# Patient Record
Sex: MALE | Race: WHITE | NOT HISPANIC OR LATINO | Employment: OTHER | URBAN - METROPOLITAN AREA
[De-identification: names, ages, dates, MRNs, and addresses within clinical notes are randomized per-mention and may not be internally consistent; named-entity substitution may affect disease eponyms.]

---

## 2023-02-28 LAB
MUCUS, URINE: NORMAL /LPF
RBC, URINE: NORMAL /HPF (ref 0–5)
WBC, URINE: NORMAL /HPF (ref 0–5)

## 2023-03-01 LAB — URINE CULTURE: NORMAL

## 2023-03-13 LAB — PROSTATE SPECIFIC AG (NG/ML) IN SER/PLAS: 1.76 NG/ML (ref 0–4)

## 2023-10-15 PROBLEM — R73.01 FASTING HYPERGLYCEMIA: Status: ACTIVE | Noted: 2023-10-15

## 2023-10-15 PROBLEM — F41.8 SITUATIONAL ANXIETY: Status: ACTIVE | Noted: 2023-10-15

## 2023-10-15 PROBLEM — Z86.010 HISTORY OF COLON POLYPS: Status: ACTIVE | Noted: 2023-10-15

## 2023-10-15 PROBLEM — N39.0 ACUTE UTI: Status: ACTIVE | Noted: 2023-10-15

## 2023-10-15 PROBLEM — S93.401A SPRAIN OF RIGHT ANKLE: Status: ACTIVE | Noted: 2023-10-15

## 2023-10-15 PROBLEM — N50.3 EPIDIDYMAL CYST: Status: ACTIVE | Noted: 2023-10-15

## 2023-10-15 PROBLEM — M70.72: Status: ACTIVE | Noted: 2023-10-15

## 2023-10-15 PROBLEM — E78.00 ELEVATED CHOLESTEROL: Status: ACTIVE | Noted: 2023-10-15

## 2023-10-15 PROBLEM — I25.10 CORONARY ARTERY DISEASE, NON-OCCLUSIVE: Status: ACTIVE | Noted: 2023-10-15

## 2023-10-15 PROBLEM — H83.09 LABYRINTHITIS: Status: ACTIVE | Noted: 2023-10-15

## 2023-10-15 PROBLEM — D69.6 THROMBOCYTOPENIA (CMS-HCC): Status: ACTIVE | Noted: 2023-10-15

## 2023-10-15 PROBLEM — Z86.0100 HISTORY OF COLON POLYPS: Status: ACTIVE | Noted: 2023-10-15

## 2023-10-15 PROBLEM — R39.15 URINARY URGENCY: Status: ACTIVE | Noted: 2023-10-15

## 2023-10-15 PROBLEM — N52.9 ED (ERECTILE DYSFUNCTION): Status: ACTIVE | Noted: 2023-10-15

## 2023-10-15 PROBLEM — N13.8 BPH WITH OBSTRUCTION/LOWER URINARY TRACT SYMPTOMS: Status: ACTIVE | Noted: 2023-10-15

## 2023-10-15 PROBLEM — N40.1 BPH WITH OBSTRUCTION/LOWER URINARY TRACT SYMPTOMS: Status: ACTIVE | Noted: 2023-10-15

## 2023-10-15 PROBLEM — R31.9 HEMATURIA: Status: ACTIVE | Noted: 2023-10-15

## 2023-10-15 PROBLEM — K21.9 GERD WITHOUT ESOPHAGITIS: Status: ACTIVE | Noted: 2023-10-15

## 2023-10-15 RX ORDER — POLYETHYLENE GLYCOL 3350, SODIUM CHLORIDE, SODIUM BICARBONATE, POTASSIUM CHLORIDE 420; 11.2; 5.72; 1.48 G/4L; G/4L; G/4L; G/4L
POWDER, FOR SOLUTION ORAL
COMMUNITY
Start: 2021-09-03

## 2023-10-17 ENCOUNTER — OFFICE VISIT (OUTPATIENT)
Dept: UROLOGY | Facility: CLINIC | Age: 78
End: 2023-10-17
Payer: MEDICARE

## 2023-10-17 VITALS — SYSTOLIC BLOOD PRESSURE: 147 MMHG | HEART RATE: 72 BPM | DIASTOLIC BLOOD PRESSURE: 78 MMHG | TEMPERATURE: 96.4 F

## 2023-10-17 DIAGNOSIS — N52.9 VASCULOGENIC ERECTILE DYSFUNCTION, UNSPECIFIED VASCULOGENIC ERECTILE DYSFUNCTION TYPE: Primary | ICD-10-CM

## 2023-10-17 DIAGNOSIS — N48.6 PEYRONIE'S DISEASE: ICD-10-CM

## 2023-10-17 PROCEDURE — 99214 OFFICE O/P EST MOD 30 MIN: CPT | Performed by: NURSE PRACTITIONER

## 2023-10-17 RX ORDER — TADALAFIL 5 MG/1
5 TABLET ORAL NIGHTLY
Qty: 30 TABLET | Refills: 3 | Status: SHIPPED | OUTPATIENT
Start: 2023-10-17 | End: 2024-03-11 | Stop reason: SDUPTHER

## 2023-10-17 ASSESSMENT — ENCOUNTER SYMPTOMS: SHORTNESS OF BREATH: 0

## 2023-10-17 NOTE — PROGRESS NOTES
Subjective   Patient ID: Xavier Steele is a 77 y.o. male who presents for Follow-up and Erectile Dysfunction.  77y  male who presents for f/u of ED. He states this initially began 15 years ago. He was found to have elevated estradiol. Underwent treatment, unknown what type, without improvement.      History of CAD. Tried dietary changes with improvement of lipids and resolution of chest pain however no improvement. Failed PDE5-I at max doses, anxiety with 10mg tadalafil      Mild increase in urgency recently. He was previously on saw palmetto although he has not taken in over a year. Wondering regarding restarting for the benefit of this.     He has a partner, wife for 50 years.      Currently using 100/30/2/4. He has utilized 60 units with minor dizziness, most consistently using 50 units. He states foreplay is necessary to improve erection. He is also using NICOLE after injection. He states erection remains until removal of ring     He did state that with alternating foreplay and use of vacuum erection device he is able to eventually obtain an erection that is of adequate quality for intercourse and resolves within 45 to 50 minutes. His wife is pleased regarding results and visit and for increased the amount of times that they are intimate. He states regardless of concentration he has similar response. Always requiring ring to maintain.      Developing curvature to the right. States erections are not painful and he is not having trouble with penetration.           Review of Systems   Respiratory:  Negative for shortness of breath (ere).    All other systems reviewed and are negative.      Objective   Physical Exam  Genitourinary:     Penis: Circumcised.       Comments: Scar tissue noted at the base of the penis on the right corpora, 2-3 cm. No pain with palpation       Alert and oriented x3  Moist mucous membranes  Breathes easily on room air  Abdomen soft, nondistended.  No edema  No scleral icterus  No  focal neurological deficits  Appears stated age, no acute distress    Assessment/Plan   Problem List Items Addressed This Visit       ED (erectile dysfunction) - Primary    Relevant Medications    tadalafil (Cialis) 5 mg tablet     Other Visit Diagnoses       Peyronie's disease        Relevant Medications    tadalafil (Cialis) 5 mg tablet             Start low dose cialis, stop if he develops side effects. Will pause use of trimix at this time. Continue with regular use of NICOLE and use of constriction ring.

## 2023-10-31 ENCOUNTER — APPOINTMENT (OUTPATIENT)
Dept: RADIOLOGY | Facility: HOSPITAL | Age: 78
End: 2023-10-31
Payer: MEDICARE

## 2023-11-17 ENCOUNTER — HOSPITAL ENCOUNTER (OUTPATIENT)
Dept: RADIOLOGY | Facility: HOSPITAL | Age: 78
Discharge: HOME | End: 2023-11-17
Payer: MEDICARE

## 2023-11-17 DIAGNOSIS — Z13.6 ENCOUNTER FOR SCREENING FOR CARDIOVASCULAR DISORDERS: ICD-10-CM

## 2023-11-17 PROCEDURE — 75571 CT HRT W/O DYE W/CA TEST: CPT

## 2024-01-18 ENCOUNTER — APPOINTMENT (OUTPATIENT)
Dept: UROLOGY | Facility: CLINIC | Age: 79
End: 2024-01-18
Payer: MEDICARE

## 2024-02-21 ENCOUNTER — LAB (OUTPATIENT)
Dept: LAB | Facility: LAB | Age: 79
End: 2024-02-21
Payer: MEDICARE

## 2024-02-21 DIAGNOSIS — Z00.00 ENCOUNTER FOR GENERAL ADULT MEDICAL EXAMINATION WITHOUT ABNORMAL FINDINGS: Primary | ICD-10-CM

## 2024-02-21 LAB
ALBUMIN SERPL BCP-MCNC: 3.8 G/DL (ref 3.4–5)
ALP SERPL-CCNC: 57 U/L (ref 33–136)
ALT SERPL W P-5'-P-CCNC: 14 U/L (ref 10–52)
ANION GAP SERPL CALC-SCNC: 10 MMOL/L (ref 10–20)
AST SERPL W P-5'-P-CCNC: 19 U/L (ref 9–39)
BASOPHILS # BLD AUTO: 0.05 X10*3/UL (ref 0–0.1)
BASOPHILS NFR BLD AUTO: 0.9 %
BILIRUB SERPL-MCNC: 0.6 MG/DL (ref 0–1.2)
BUN SERPL-MCNC: 11 MG/DL (ref 6–23)
CALCIUM SERPL-MCNC: 8.9 MG/DL (ref 8.6–10.6)
CHLORIDE SERPL-SCNC: 105 MMOL/L (ref 98–107)
CHOLEST SERPL-MCNC: 130 MG/DL (ref 0–199)
CHOLESTEROL/HDL RATIO: 4.4
CO2 SERPL-SCNC: 30 MMOL/L (ref 21–32)
CREAT SERPL-MCNC: 0.76 MG/DL (ref 0.5–1.3)
EGFRCR SERPLBLD CKD-EPI 2021: >90 ML/MIN/1.73M*2
EOSINOPHIL # BLD AUTO: 0.14 X10*3/UL (ref 0–0.4)
EOSINOPHIL NFR BLD AUTO: 2.4 %
ERYTHROCYTE [DISTWIDTH] IN BLOOD BY AUTOMATED COUNT: 12.2 % (ref 11.5–14.5)
GLUCOSE SERPL-MCNC: 89 MG/DL (ref 74–99)
HCT VFR BLD AUTO: 43.4 % (ref 41–52)
HDLC SERPL-MCNC: 29.6 MG/DL
HGB BLD-MCNC: 14.6 G/DL (ref 13.5–17.5)
IMM GRANULOCYTES # BLD AUTO: 0.03 X10*3/UL (ref 0–0.5)
IMM GRANULOCYTES NFR BLD AUTO: 0.5 % (ref 0–0.9)
LDLC SERPL CALC-MCNC: 71 MG/DL
LYMPHOCYTES # BLD AUTO: 1.23 X10*3/UL (ref 0.8–3)
LYMPHOCYTES NFR BLD AUTO: 21.2 %
MAGNESIUM SERPL-MCNC: 2.05 MG/DL (ref 1.6–2.4)
MCH RBC QN AUTO: 31.9 PG (ref 26–34)
MCHC RBC AUTO-ENTMCNC: 33.6 G/DL (ref 32–36)
MCV RBC AUTO: 95 FL (ref 80–100)
MONOCYTES # BLD AUTO: 0.68 X10*3/UL (ref 0.05–0.8)
MONOCYTES NFR BLD AUTO: 11.7 %
NEUTROPHILS # BLD AUTO: 3.68 X10*3/UL (ref 1.6–5.5)
NEUTROPHILS NFR BLD AUTO: 63.3 %
NON HDL CHOLESTEROL: 100 MG/DL (ref 0–149)
NRBC BLD-RTO: 0 /100 WBCS (ref 0–0)
PLATELET # BLD AUTO: 196 X10*3/UL (ref 150–450)
POTASSIUM SERPL-SCNC: 4.8 MMOL/L (ref 3.5–5.3)
PROT SERPL-MCNC: 6.6 G/DL (ref 6.4–8.2)
RBC # BLD AUTO: 4.57 X10*6/UL (ref 4.5–5.9)
SODIUM SERPL-SCNC: 140 MMOL/L (ref 136–145)
TRIGL SERPL-MCNC: 146 MG/DL (ref 0–149)
TSH SERPL-ACNC: 2.91 MIU/L (ref 0.44–3.98)
VLDL: 29 MG/DL (ref 0–40)
WBC # BLD AUTO: 5.8 X10*3/UL (ref 4.4–11.3)

## 2024-02-21 PROCEDURE — 80061 LIPID PANEL: CPT

## 2024-02-21 PROCEDURE — 36415 COLL VENOUS BLD VENIPUNCTURE: CPT

## 2024-02-21 PROCEDURE — 80053 COMPREHEN METABOLIC PANEL: CPT

## 2024-02-21 PROCEDURE — 83735 ASSAY OF MAGNESIUM: CPT

## 2024-02-21 PROCEDURE — 85025 COMPLETE CBC W/AUTO DIFF WBC: CPT | Mod: WAIVER OF LIABILITY ON FILE

## 2024-02-21 PROCEDURE — 84443 ASSAY THYROID STIM HORMONE: CPT

## 2024-02-23 ENCOUNTER — APPOINTMENT (OUTPATIENT)
Dept: UROLOGY | Facility: CLINIC | Age: 79
End: 2024-02-23
Payer: MEDICARE

## 2024-02-23 ENCOUNTER — OFFICE VISIT (OUTPATIENT)
Dept: UROLOGY | Facility: CLINIC | Age: 79
End: 2024-02-23
Payer: MEDICARE

## 2024-02-23 VITALS
BODY MASS INDEX: 22.89 KG/M2 | DIASTOLIC BLOOD PRESSURE: 76 MMHG | SYSTOLIC BLOOD PRESSURE: 132 MMHG | HEART RATE: 67 BPM | WEIGHT: 155 LBS | TEMPERATURE: 98.1 F

## 2024-02-23 DIAGNOSIS — N40.1 BPH WITH OBSTRUCTION/LOWER URINARY TRACT SYMPTOMS: ICD-10-CM

## 2024-02-23 DIAGNOSIS — N13.8 BPH WITH OBSTRUCTION/LOWER URINARY TRACT SYMPTOMS: ICD-10-CM

## 2024-02-23 DIAGNOSIS — N52.9 VASCULOGENIC ERECTILE DYSFUNCTION, UNSPECIFIED VASCULOGENIC ERECTILE DYSFUNCTION TYPE: Primary | ICD-10-CM

## 2024-02-23 PROCEDURE — 1159F MED LIST DOCD IN RCRD: CPT | Performed by: NURSE PRACTITIONER

## 2024-02-23 PROCEDURE — 1036F TOBACCO NON-USER: CPT | Performed by: NURSE PRACTITIONER

## 2024-02-23 PROCEDURE — 99213 OFFICE O/P EST LOW 20 MIN: CPT | Performed by: NURSE PRACTITIONER

## 2024-02-23 NOTE — PROGRESS NOTES
Subjective   Patient ID: Xavier Steele is a 78 y.o. male who presents for Erectile Dysfunction (Follow Up ) and Follow-up.  Follow up of ED. He states this initially began 15 years ago. He was found to have elevated estradiol. Underwent treatment, unknown what type, without improvement.      History of CAD.     Mild increase in urgency recently. Doing well with tadalafil 5mg daily.      He has a partner, wife for 50 years.      He did state that with alternating foreplay and use of vacuum erection device he is able to eventually obtain an erection that is of adequate quality for intercourse and resolves within 45 to 50 minutes. His wife is pleased regarding results and visit and for increased the amount of times that they are intimate. He states regardless of concentration he has similar response. Always requiring ring to maintain.      Developing curvature to the right. States erections are not painful and he is not having trouble with penetration. States minimal improvement of curvature with use of pump. No longer using ICI.           Review of Systems   All other systems reviewed and are negative.      Objective   Physical Exam  Vitals reviewed.     Alert and oriented x3  Moist mucous membranes  Breathes easily on room air  Abdomen soft, nondistended  No edema  No scleral icterus  No focal neurological deficits  Appears stated age, no acute distress      Assessment/Plan   Diagnoses and all orders for this visit:  Vasculogenic erectile dysfunction, unspecified vasculogenic erectile dysfunction type           YESICA Morel-CNP 02/23/24 10:36 AM

## 2024-03-11 DIAGNOSIS — N48.6 PEYRONIE'S DISEASE: ICD-10-CM

## 2024-03-11 DIAGNOSIS — N52.9 VASCULOGENIC ERECTILE DYSFUNCTION, UNSPECIFIED VASCULOGENIC ERECTILE DYSFUNCTION TYPE: ICD-10-CM

## 2024-03-11 RX ORDER — TADALAFIL 5 MG/1
5 TABLET ORAL NIGHTLY
Qty: 90 TABLET | Refills: 3 | Status: SHIPPED | OUTPATIENT
Start: 2024-03-11 | End: 2025-03-11

## 2024-09-23 ENCOUNTER — HOSPITAL ENCOUNTER (OUTPATIENT)
Dept: RADIOLOGY | Facility: HOSPITAL | Age: 79
Discharge: HOME | End: 2024-09-23
Payer: MEDICARE

## 2024-09-23 ENCOUNTER — LAB (OUTPATIENT)
Dept: LAB | Facility: LAB | Age: 79
End: 2024-09-23
Payer: MEDICARE

## 2024-09-23 DIAGNOSIS — R91.1 SOLITARY PULMONARY NODULE: ICD-10-CM

## 2024-09-23 DIAGNOSIS — R79.89 OTHER SPECIFIED ABNORMAL FINDINGS OF BLOOD CHEMISTRY: Primary | ICD-10-CM

## 2024-09-23 LAB — TSH SERPL-ACNC: 2.5 MIU/L (ref 0.44–3.98)

## 2024-09-23 PROCEDURE — 71250 CT THORAX DX C-: CPT | Performed by: RADIOLOGY

## 2024-09-23 PROCEDURE — 71250 CT THORAX DX C-: CPT

## 2024-09-23 PROCEDURE — 36415 COLL VENOUS BLD VENIPUNCTURE: CPT

## 2025-02-24 ENCOUNTER — APPOINTMENT (OUTPATIENT)
Dept: UROLOGY | Facility: CLINIC | Age: 80
End: 2025-02-24
Payer: MEDICARE

## 2025-03-13 ENCOUNTER — APPOINTMENT (OUTPATIENT)
Dept: UROLOGY | Facility: CLINIC | Age: 80
End: 2025-03-13
Payer: MEDICARE

## 2025-03-13 VITALS
HEIGHT: 69 IN | SYSTOLIC BLOOD PRESSURE: 149 MMHG | HEART RATE: 61 BPM | WEIGHT: 152.8 LBS | DIASTOLIC BLOOD PRESSURE: 79 MMHG | BODY MASS INDEX: 22.63 KG/M2

## 2025-03-13 DIAGNOSIS — N48.6 PEYRONIE'S DISEASE: ICD-10-CM

## 2025-03-13 DIAGNOSIS — N13.8 BPH WITH OBSTRUCTION/LOWER URINARY TRACT SYMPTOMS: ICD-10-CM

## 2025-03-13 DIAGNOSIS — N52.9 VASCULOGENIC ERECTILE DYSFUNCTION, UNSPECIFIED VASCULOGENIC ERECTILE DYSFUNCTION TYPE: Primary | ICD-10-CM

## 2025-03-13 DIAGNOSIS — N40.1 BPH WITH OBSTRUCTION/LOWER URINARY TRACT SYMPTOMS: ICD-10-CM

## 2025-03-13 LAB
POC APPEARANCE, URINE: CLEAR
POC BILIRUBIN, URINE: NEGATIVE
POC BLOOD, URINE: NEGATIVE
POC COLOR, URINE: YELLOW
POC GLUCOSE, URINE: NEGATIVE MG/DL
POC KETONES, URINE: NEGATIVE MG/DL
POC LEUKOCYTES, URINE: NEGATIVE
POC NITRITE,URINE: NEGATIVE
POC PH, URINE: 5.5 PH
POC PROTEIN, URINE: NEGATIVE MG/DL
POC SPECIFIC GRAVITY, URINE: >=1.03
POC UROBILINOGEN, URINE: 0.2 EU/DL

## 2025-03-13 PROCEDURE — 81003 URINALYSIS AUTO W/O SCOPE: CPT | Performed by: NURSE PRACTITIONER

## 2025-03-13 PROCEDURE — 99213 OFFICE O/P EST LOW 20 MIN: CPT | Performed by: NURSE PRACTITIONER

## 2025-03-13 PROCEDURE — 1036F TOBACCO NON-USER: CPT | Performed by: NURSE PRACTITIONER

## 2025-03-13 PROCEDURE — 1159F MED LIST DOCD IN RCRD: CPT | Performed by: NURSE PRACTITIONER

## 2025-03-13 NOTE — PROGRESS NOTES
Subjective   Patient ID: Xavier Steele is a 79 y.o. male who presents for Follow-up (6 months/).  Follow up of ED. He states this initially began 15 years ago. He was found to have elevated estradiol. Underwent treatment, unknown what type, without improvement.      History of CAD.     He has a partner, wife for 50 years.      He did state that with alternating foreplay and use of vacuum erection device he is able to eventually obtain an erection that is of adequate quality for intercourse and resolves within 45 to 50 minutes. His wife is pleased regarding results and visit and for increased the amount of times that they are intimate. He states regardless of concentration he has similar response. Always requiring ring to maintain.      Developing curvature to the right. States erections are not painful and he is not having trouble with penetration. States minimal improvement of curvature with use of pump. No longer using ICI. Previously on tadalafil 5mg, ran out in Jan. Doing well with use of pump.              Review of Systems   All other systems reviewed and are negative.      Objective   Physical Exam  Vitals reviewed.     Alert and oriented x3  Moist mucous membranes  Breathes easily on room air  Abdomen soft, nondistended  No edema  No scleral icterus  No focal neurological deficits  Appears stated age, no acute distress    Office Visit on 03/13/2025   Component Date Value Ref Range Status    POC Color, Urine 03/13/2025 Yellow  Straw, Yellow, Light-Yellow Final    POC Appearance, Urine 03/13/2025 Clear  Clear Final    POC Glucose, Urine 03/13/2025 NEGATIVE  NEGATIVE mg/dl Final    POC Bilirubin, Urine 03/13/2025 NEGATIVE  NEGATIVE Final    POC Ketones, Urine 03/13/2025 NEGATIVE  NEGATIVE mg/dl Final    POC Specific Gravity, Urine 03/13/2025 >=1.030  1.005 - 1.035 Final    POC Blood, Urine 03/13/2025 NEGATIVE  NEGATIVE Final    POC PH, Urine 03/13/2025 5.5  No Reference Range Established PH Final    POC  Protein, Urine 03/13/2025 NEGATIVE  NEGATIVE mg/dl Final    POC Urobilinogen, Urine 03/13/2025 0.2  0.2, 1.0 EU/DL Final    Poc Nitrite, Urine 03/13/2025 NEGATIVE  NEGATIVE Final    POC Leukocytes, Urine 03/13/2025 NEGATIVE  NEGATIVE Final         Assessment/Plan   Diagnoses and all orders for this visit:  Vasculogenic erectile dysfunction, unspecified vasculogenic erectile dysfunction type  BPH with obstruction/lower urinary tract symptoms  -     POCT UA Automated manually resulted  Peyronie's disease    Pleased with current plan of care. Will plan for annual follow up or sooner if needed. Patient verbalized understanding of plan.         Bhavna Gutierrez, YESICA-CNP 03/13/25 9:22 AM

## 2025-06-13 ENCOUNTER — OFFICE VISIT (OUTPATIENT)
Dept: ORTHOPEDIC SURGERY | Facility: HOSPITAL | Age: 80
End: 2025-06-13
Payer: MEDICARE

## 2025-06-13 DIAGNOSIS — M65.311 TRIGGER FINGER OF RIGHT THUMB: Primary | ICD-10-CM

## 2025-06-13 PROCEDURE — 99202 OFFICE O/P NEW SF 15 MIN: CPT | Performed by: ORTHOPAEDIC SURGERY

## 2025-06-13 PROCEDURE — 99203 OFFICE O/P NEW LOW 30 MIN: CPT | Performed by: ORTHOPAEDIC SURGERY

## 2025-06-13 PROCEDURE — 1160F RVW MEDS BY RX/DR IN RCRD: CPT | Performed by: ORTHOPAEDIC SURGERY

## 2025-06-13 PROCEDURE — 1125F AMNT PAIN NOTED PAIN PRSNT: CPT | Performed by: ORTHOPAEDIC SURGERY

## 2025-06-13 PROCEDURE — 1036F TOBACCO NON-USER: CPT | Performed by: ORTHOPAEDIC SURGERY

## 2025-06-13 PROCEDURE — 1159F MED LIST DOCD IN RCRD: CPT | Performed by: ORTHOPAEDIC SURGERY

## 2025-06-13 ASSESSMENT — PAIN SCALES - GENERAL: PAINLEVEL_OUTOF10: 3

## 2025-06-13 ASSESSMENT — PAIN - FUNCTIONAL ASSESSMENT: PAIN_FUNCTIONAL_ASSESSMENT: 0-10

## 2025-06-13 NOTE — LETTER
June 25, 2025     Jose Elias Quiroz MD  [address]  [facility] [address]    Patient: Xavier Steele   YOB: 1945   Date of Visit: 6/13/2025       Dear Dr. Jose Elias Quiroz MD:    Thank you for referring Xavier Steele to me for evaluation. Below are my notes for this consultation.  If you have questions, please do not hesitate to call me. I look forward to following your patient along with you.       Sincerely,     Prashanth Kruse MD      CC: No Recipients  ______________________________________________________________________________________    CHIEF COMPLAINT         Right trigger thumb pain    ASSESSMENT + PLAN    Right trigger thumb    The nature of trigger finger was reviewed, along with the natural history.  I reviewed the options for management, including observation, nonsteroidals, splinting, oral steroids, cortisone injection, or surgical release, along with the likely success rates of each.      I reviewed the risks of oral anti-inflammatories, including GI upset or even GI bleeding, and the patient did not want to run that risk.    I reviewed the technique of using a stack of Band-Aids over the volar skin crease on the PIP joint as a splint.  This should help decrease triggering events, and does have some utility at eliminating the symptoms altogether.    Patient will followup after 4 weeks if still symptomatic, or with any concerns.        HISTORY OF PRESENT ILLNESS       Patient is a 79 y.o. right-hand dominant male retiree, who presents today at suggestion of Dr. Jaimes for evaluation of popping and clicking of his right thumb.  This has been present for about a month.  No recalled direct trauma.  Symptoms are primarily in the morning and loosen up during the day.  They have occasionally required use of the opposite hand for extension.  He has been on anastrozole and converted to letrozole.  No other bothersome  digits.  No treatment so far.    He is not diabetic or hypothyroid.  He does not smoke.      REVIEW OF SYSTEMS       A 30-item multi-system Review Of Systems was obtained on today's intake form.  This was reviewed with the patient and is correct.  The pertinent positives and negatives are listed above.  The form has been scanned separately into the medical record.      PHYSICAL EXAM    Constitutional:    Appears stated age. Well-developed and well-nourished male in no acute distress.  Psychiatric:         Pleasant normal mood and affect. Behavior is appropriate for the situation.   Head:                   Normocephalic and atraumatic.  Eyes:                    Pupils are equal and round.  Cardiovascular:  2+ radial and ulnar pulses. Fingers well-perfused.  Respiratory:        Effort normal. No respiratory distress. Speaking in complete sentences.  Neurologic:       Alert and oriented to person, place, and time.  Skin:                Skin is intact, warm and dry.  Hematologic / Lymphatic:    No lymphedema or lymphangitis.    Extremities / Musculoskeletal:                      Skin of right thumb and hand is intact with no erythema, ecchymosis, or diffuse swelling.  Normal skin drag and coloration.  Palpable flexor nodule and A1 tenderness only of right thumb.  Obvious spontaneous triggering.  No CMC or de Quervain's signs.  Negative Beauchamp.  Negative midcarpal shift.  Symmetric wrist and forearm motion.  Sensation intact to light touch in all distributions.  Capillary refill less than 2 seconds.      IMAGING / LABS / EMGs           None pertinent.      Medical History[1]    Medication Documentation Review Audit       Reviewed by Maddy Ramirez MA (Medical Assistant) on 03/13/25 at 0917      Medication Order Taking? Sig Documenting Provider Last Dose Status   polyethylene glycol-electrolytes 420 gram solution [id number] No Take by mouth. drink 1/2 beginning at 6pm night before the procedure and start drinking the 2nd  1/2 5 hours before procedure time   Patient not taking: Reported on 3/13/2025    Historical Provider, MD Taking Active   tadalafil (Cialis) 5 mg tablet [id number]  Take 1 tablet (5 mg) by mouth once daily at bedtime. Bhavna Gutierrez, APRN-CNP   25 [id number]                    RX Allergies[2]    Social History     Socioeconomic History   • Marital status:      Spouse name: Not on file   • Number of children: Not on file   • Years of education: Not on file   • Highest education level: Not on file   Occupational History   • Not on file   Tobacco Use   • Smoking status: Never   • Smokeless tobacco: Never   Substance and Sexual Activity   • Alcohol use: Not Currently   • Drug use: Never   • Sexual activity: Not on file   Other Topics Concern   • Not on file   Social History Narrative   • Not on file     Social Drivers of Health     Financial Resource Strain: Low Risk  (10/5/2022)    Received from [facility]    Overall Financial Resource Strain (CARDIA)    • Difficulty of Paying Living Expenses: Not hard at all   Food Insecurity: No Food Insecurity (10/5/2022)    Received from [facility]    Hunger Vital Sign    • Worried About Running Out of Food in the Last Year: Never true    • Ran Out of Food in the Last Year: Never true   Transportation Needs: No Transportation Needs (2023)    Received from [facility]    [facility] - [facility]    • Lack of Transportation (Medical): No    • Lack of Transportation (Non-Medical): No   Physical Activity: Sufficiently Active (2023)    Received from [facility]    Exercise Vital Sign    • Days of Exercise per Week: 3 days    • Minutes of Exercise per Session: 120 min   Stress: No Stress Concern Present (2023)    Received from [facility]    [employer] - Occupational Stress Questionnaire    • Feeling of Stress : Not at all   Social Connections: Unknown (2023)    Received from [facility]     Social Connection and Isolation Panel [NHANES]    • Frequency of Communication with Friends and Family: Not on file    • Frequency of Social Gatherings with Friends and Family: Not on file    • Attends Roman Catholic Services: More than 4 times per year    • Active Member of Clubs or Organizations: Not on file    • Attends Club or Organization Meetings: Not on file    • Marital Status:    Intimate Partner Violence: Not At Risk (11/28/2023)    Received from [facility]    Humiliation, Afraid, Rape, and Kick questionnaire    • Fear of Current or Ex-Partner: No    • Emotionally Abused: No    • Physically Abused: No    • Sexually Abused: No   Housing Stability: Unknown (11/28/2023)    Received from [facility]    Housing Stability Vital Sign    • Unable to Pay for Housing in the Last Year: Not on file    • Number of Places Lived in the Last Year: 1    • Unstable Housing in the Last Year: Not on file       Surgical History[3]      Electronically Signed      CLIF Kruse MD      Orthopaedic Hand Surgery      [phone]         [1]  Past Medical History:  Diagnosis Date   • Atherosclerotic heart disease of native coronary artery without angina pectoris     Arteriosclerotic heart disease (ASHD)   • Atherosclerotic heart disease of native coronary artery without angina pectoris 06/20/2017    Coronary artery disease, non-occlusive   • Chondrocostal junction syndrome (tietze)     Costochondritis   • Impaired fasting glucose 06/20/2017    Fasting hyperglycemia   • Labyrinthitis, unspecified ear 11/11/2016    Labyrinthitis, unspecified laterality   • Other bursitis of hip, left hip 03/24/2015    Bursitis of pelvic region, left   • Other specified anxiety disorders 07/30/2014    Situational anxiety   • Personal history of other diseases of the digestive system     History of esophageal reflux   • Personal history of other diseases of the respiratory system     Personal history of asthma   • Solitary pulmonary  nodule     Solitary pulmonary nodule   • Thrombocytopenia, unspecified 06/20/2017    Thrombocytopenia   • Unspecified atherosclerosis 03/17/2014    Atherosclerosis   [2]  No Known Allergies  [3]  Past Surgical History:  Procedure Laterality Date   • APPENDECTOMY  03/17/2014    Appendectomy   • COLONOSCOPY  01/03/2014    Complete Colonoscopy   • COLONOSCOPY  01/10/2014    Complete Colonoscopy   • OTHER SURGICAL HISTORY  03/17/2014    Excision Of Single Jacob's Neuroma

## 2025-06-13 NOTE — PROGRESS NOTES
CHIEF COMPLAINT         Right trigger thumb pain    ASSESSMENT + PLAN     ***        HISTORY OF PRESENT ILLNESS       Patient is a 79 y.o. ***-hand dominant male ***, who presents today for evaluation of ***      REVIEW OF SYSTEMS       A 30-item multi-system Review Of Systems was obtained on today's intake form.  This was reviewed with the patient and is correct.  The pertinent positives and negatives are listed above.  The form has been scanned separately into the medical record.      PHYSICAL EXAM    Constitutional:    Appears stated age. Well-developed and well-nourished ***.  Psychiatric:         Pleasant normal mood and affect. Behavior is appropriate for the situation.   Head:                   Normocephalic and atraumatic.  Eyes:                    Pupils are equal and round.  Cardiovascular:  2+ radial and ulnar pulses. Fingers well-perfused.  Respiratory:        Effort normal. No respiratory distress. Speaking in complete sentences.  Neurologic:       Alert and oriented to person, place, and time.  Skin:                Skin is intact, warm and dry.  Hematologic / Lymphatic:    No lymphedema or lymphangitis.    Extremities / Musculoskeletal:                      ***      IMAGING / LABS / EMGs           ***      Medical History[1]    Medication Documentation Review Audit       Reviewed by Maddy Ramirez MA (Medical Assistant) on 25 at 0917      Medication Order Taking? Sig Documenting Provider Last Dose Status   polyethylene glycol-electrolytes 420 gram solution [id number] No Take by mouth. drink 1/2 beginning at 6pm night before the procedure and start drinking the 2nd 1/2 5 hours before procedure time   Patient not taking: Reported on 3/13/2025    Historical Provider, MD Taking Active   tadalafil (Cialis) 5 mg tablet [id number]  Take 1 tablet (5 mg) by mouth once daily at bedtime. YESICA Morel-CNP   25 [id number]                    RX Allergies[2]    Social History     Socioeconomic  History    Marital status:      Spouse name: Not on file    Number of children: Not on file    Years of education: Not on file    Highest education level: Not on file   Occupational History    Not on file   Tobacco Use    Smoking status: Never    Smokeless tobacco: Never   Substance and Sexual Activity    Alcohol use: Not Currently    Drug use: Never    Sexual activity: Not on file   Other Topics Concern    Not on file   Social History Narrative    Not on file     Social Drivers of Health     Financial Resource Strain: Low Risk  (10/5/2022)    Received from [facility]    Overall Financial Resource Strain (CARDIA)     Difficulty of Paying Living Expenses: Not hard at all   Food Insecurity: No Food Insecurity (10/5/2022)    Received from [facility]    Hunger Vital Sign     Worried About Running Out of Food in the Last Year: Never true     Ran Out of Food in the Last Year: Never true   Transportation Needs: No Transportation Needs (4/26/2023)    Received from [facility]    [facility] - Transportation     Lack of Transportation (Medical): No     Lack of Transportation (Non-Medical): No   Physical Activity: Sufficiently Active (11/28/2023)    Received from [facility]    Exercise Vital Sign     Days of Exercise per Week: 3 days     Minutes of Exercise per Session: 120 min   Stress: No Stress Concern Present (11/28/2023)    Received from [facility]    [employer] - Occupational Stress Questionnaire     Feeling of Stress : Not at all   Social Connections: Unknown (11/28/2023)    Received from [facility]    Social Connection and Isolation Panel [NHANES]     Frequency of Communication with Friends and Family: Not on file     Frequency of Social Gatherings with Friends and Family: Not on file     Attends Caodaism Services: More than 4 times per year     Active Member of Clubs or Organizations: Not on file     Attends Club or Organization Meetings: Not on file      Marital Status:    Intimate Partner Violence: Not At Risk (11/28/2023)    Received from [facility]    Humiliation, Afraid, Rape, and Kick questionnaire     Fear of Current or Ex-Partner: No     Emotionally Abused: No     Physically Abused: No     Sexually Abused: No   Housing Stability: Unknown (11/28/2023)    Received from [facility]    Housing Stability Vital Sign     Unable to Pay for Housing in the Last Year: Not on file     Number of Places Lived in the Last Year: 1     Unstable Housing in the Last Year: Not on file       Surgical History[3]      Electronically Signed      CLIF Kruse MD      Orthopaedic Hand Surgery      [phone]       [1]   Past Medical History:  Diagnosis Date    Atherosclerotic heart disease of native coronary artery without angina pectoris     Arteriosclerotic heart disease (ASHD)    Atherosclerotic heart disease of native coronary artery without angina pectoris 06/20/2017    Coronary artery disease, non-occlusive    Chondrocostal junction syndrome (tietze)     Costochondritis    Impaired fasting glucose 06/20/2017    Fasting hyperglycemia    Labyrinthitis, unspecified ear 11/11/2016    Labyrinthitis, unspecified laterality    Other bursitis of hip, left hip 03/24/2015    Bursitis of pelvic region, left    Other specified anxiety disorders 07/30/2014    Situational anxiety    Personal history of other diseases of the digestive system     History of esophageal reflux    Personal history of other diseases of the respiratory system     Personal history of asthma    Solitary pulmonary nodule     Solitary pulmonary nodule    Thrombocytopenia, unspecified 06/20/2017    Thrombocytopenia    Unspecified atherosclerosis 03/17/2014    Atherosclerosis   [2] No Known Allergies  [3]   Past Surgical History:  Procedure Laterality Date    APPENDECTOMY  03/17/2014    Appendectomy    COLONOSCOPY  01/03/2014    Complete Colonoscopy    COLONOSCOPY  01/10/2014    Complete  Colonoscopy    OTHER SURGICAL HISTORY  03/17/2014    Excision Of Single Jacob's Neuroma

## 2026-03-16 ENCOUNTER — APPOINTMENT (OUTPATIENT)
Dept: UROLOGY | Facility: CLINIC | Age: 81
End: 2026-03-16
Payer: MEDICARE